# Patient Record
(demographics unavailable — no encounter records)

---

## 2025-03-24 NOTE — REVIEW OF SYSTEMS
[Feeling Tired] : feeling tired [Numbness] : numbness [Sleep Disturbances] : sleep disturbances [Negative] : ENT [Confused or Disoriented] : no confusion [Facial Weakness] : no facial weakness [Arm Weakness] : no arm weakness [Hand Weakness] : no hand weakness [Leg Weakness] : no leg weakness [Poor Coordination] : good coordination [Dizziness] : no dizziness [Lightheadedness] : no lightheadedness [Difficulty Walking] : no difficulty walking [Frequent Falls] : not falling

## 2025-03-24 NOTE — ASSESSMENT
[FreeTextEntry1] : 84 yo RHF w/ Lumbar Radiculopathy found to have mild UNE without any significant motor component recommend observation and corrective positioning for now.  No obvious radicular process affecting the LLE currently and recommend continue current management with DIANE and PT for now.   Plan - Encourage change in sleep positions to avoid worsening UNE symptoms. No need for brace at this time  - Encourage continued increase in hydration to improve hand cramping and to drink tonic water if the cramps are severe - f/u with Dr. Sanchez for DIANE if needed - RTC in 4 - 6 months

## 2025-03-24 NOTE — PROCEDURE
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  There is currently no evidence for a diffuse neuropathic process or radicular process in the left leg.  Clinical correlation recommended.

## 2025-03-24 NOTE — ASSESSMENT
[FreeTextEntry1] : 82 yo RHF w/ Lumbar Radiculopathy found to have mild UNE without any significant motor component recommend observation and corrective positioning for now.  No obvious radicular process affecting the LLE currently and recommend continue current management with DAINE and PT for now.   Plan - Encourage change in sleep positions to avoid worsening UNE symptoms. No need for brace at this time  - Encourage continued increase in hydration to improve hand cramping and to drink tonic water if the cramps are severe - f/u with Dr. Sanchez for DIANE if needed - RTC in 4 - 6 months

## 2025-03-24 NOTE — PHYSICAL EXAM
[FreeTextEntry1] : Mental status: Alert and oriented x3.  Recent and remote memory intact.  No dysarthria, no aphasia. Fund of knowledge appropriate.   Cranial nerves: Pupils equally round and reactive to light, visual fields full, no nystagmus, extraocular muscles intact, V1 through V3 intact bilaterally and symmetric, face symmetric, hearing intact to finger rub, palate elevation symmetric, tongue was midline.   Motor:  MRC grading 5/5 b/l UE/LE.  strength 5/5.  Normal tone and bulk. No abnormal movements.   Sensation: Intact to light touch and temp except for decrease vib sense in b/l and pinprick in all extremities except in the LLE in which there was decreased sensation to light touch and pinprick at her lateral lower thigh, dorsal, medial, and lateral foot up to metatarsals, and plantar foot up to the tarsals, and last 3 digits of her foot.   Coordination: No dysmetria on finger-to-nose and heel-to-shin. No dysdiadochokinesia.   Reflexes: 2+ in bilateral UE, patella and 1+ b/l achilles. down going toes right and mute left. (-) Ayala.   Gait: Narrow and steady. No ataxia.   Neg SLR b/l  Neg Tinel  Neg Phalen No paresthesia to percussion of the elbow

## 2025-03-24 NOTE — HISTORY OF PRESENT ILLNESS
[FreeTextEntry1] : Since her last visit, Ms. Pruitt continues to have pain in the R foot when she steps down.  Since last seen 08/2024 she reported progression of her UE symptoms now experiencing paresthesia of digits 2-5 of b/l UE at night which improves w/ massage of the hands, no weakness of the hands, She also c/o painful spasms of the hands mostly in the 4th /5th digit as well as b/l calf worst on the right continues to take magnesium supplements with minimal improvement and hasn't tried tonic water.  Symptoms occurs at most twice per week lasting up to 20 mins at times. Slow improvement with massage. Numbness in the left foot has been stable. Currently no endorsing back pain however, received steroid injections every two months for chronic back pain and is scheduled for another round with Dr. Ferrara.

## 2025-03-31 NOTE — DISCUSSION/SUMMARY
[FreeTextEntry1] : Pap every 2 years Self breast exam stressed Prescribed yearly bilateral screening mammogram Prescribed Lotrisone cream Advised follow-up with pain management Follow-up yearly or as needed

## 2025-03-31 NOTE — HISTORY OF PRESENT ILLNESS
[FreeTextEntry1] : Patient is 83 years old para 3-0-0-3 last menstrual period age 55 She denies postmenopausal bleeding Patient states that 1 week ago she noted acute onset of right lower quadrant pain She has a history of lumbar radiculopathy with disc deterioration and is status post injections by pain management physician. Patient notes right vulva irritation/pain/inflammation

## 2025-03-31 NOTE — PHYSICAL EXAM
[Chaperone Present] : A chaperone was present in the examining room during all aspects of the physical examination [FreeTextEntry2] : Latia [Appropriately responsive] : appropriately responsive [Alert] : alert [No Acute Distress] : no acute distress [No Lymphadenopathy] : no lymphadenopathy [Regular Rate Rhythm] : regular rate rhythm [No Murmurs] : no murmurs [Clear to Auscultation B/L] : clear to auscultation bilaterally [Soft] : soft [Non-tender] : non-tender [Non-distended] : non-distended [No HSM] : No HSM [No Lesions] : no lesions [No Mass] : no mass [Oriented x3] : oriented x3 [Examination Of The Breasts] : a normal appearance [No Masses] : no breast masses were palpable [Labia Majora] : normal [Labia Minora] : normal [Atrophy] : atrophy [Rectocele] : a rectocele [Cervical Stenosis] : cervical stenosis [Normal] : normal [Uterine Adnexae] : normal

## 2025-06-26 NOTE — HISTORY OF PRESENT ILLNESS
[de-identified] : 83-year-old lady with low back pain, right knee pain. No trauma. Considering surgery but at this point not ready to commit. She has some concerns about going through the procedure and not making a full recovery because of her age. She would like to try injection first.   Imaging: Lumbar spine X-rays were taken in the office today.   AP and Lateral views were obtained. X-rays are negative for acute bone or soft tissue trauma. Degenerative changes noted   Imaging: X-rays were reviewed with the patient.   AP and Lateral views were obtained of the hips, including the contralateral side for comparison purposes. X-rays are negative for acute bone or soft tissue trauma.  Imaging: X-rays were reviewed with the patient.   AP and Lateral views were obtained of the knees, including the contralateral side for comparison purposes. X-rays are negative for acute bone or soft tissue trauma. Severe degenerative changes noted including osteophytes, bone to bone apposition, sclerotic and cystic changes.  Grade 4 Kellgren Arturo changes throughout.   Plan is for right knee injection. Prior to injection, risks and benefits of the procedure were discussed, including but not limited to pain, swelling, failure to improve symptoms and in rare cases infection or allergic reaction.  The knee was prepped and draped with betadine and alcohol.  Using sterile technique 1cc of 40mg/cc kenalog solution mixed with 4cc of 1% lidocaine was injected thru an anterolateral portal using a 22guage needle.  Upon withdrawing the needle pressure was applied with to the injection site for approximately 1 minute.  Once hemostasis was achieved a band-aid dressing was applied.  The patient tolerated the procedure well.